# Patient Record
Sex: MALE | Race: WHITE | NOT HISPANIC OR LATINO
[De-identification: names, ages, dates, MRNs, and addresses within clinical notes are randomized per-mention and may not be internally consistent; named-entity substitution may affect disease eponyms.]

---

## 2022-05-18 ENCOUNTER — APPOINTMENT (OUTPATIENT)
Dept: FAMILY MEDICINE | Facility: CLINIC | Age: 22
End: 2022-05-18
Payer: COMMERCIAL

## 2022-05-18 ENCOUNTER — LABORATORY RESULT (OUTPATIENT)
Age: 22
End: 2022-05-18

## 2022-05-18 ENCOUNTER — NON-APPOINTMENT (OUTPATIENT)
Age: 22
End: 2022-05-18

## 2022-05-18 VITALS
RESPIRATION RATE: 16 BRPM | DIASTOLIC BLOOD PRESSURE: 74 MMHG | WEIGHT: 115 LBS | SYSTOLIC BLOOD PRESSURE: 124 MMHG | OXYGEN SATURATION: 99 % | HEIGHT: 67 IN | BODY MASS INDEX: 18.05 KG/M2 | HEART RATE: 101 BPM | TEMPERATURE: 97.9 F

## 2022-05-18 DIAGNOSIS — Z83.49 FAMILY HISTORY OF OTHER ENDOCRINE, NUTRITIONAL AND METABOLIC DISEASES: ICD-10-CM

## 2022-05-18 DIAGNOSIS — Z78.9 OTHER SPECIFIED HEALTH STATUS: ICD-10-CM

## 2022-05-18 DIAGNOSIS — Z86.39 PERSONAL HISTORY OF OTHER ENDOCRINE, NUTRITIONAL AND METABOLIC DISEASE: ICD-10-CM

## 2022-05-18 DIAGNOSIS — F17.200 NICOTINE DEPENDENCE, UNSPECIFIED, UNCOMPLICATED: ICD-10-CM

## 2022-05-18 DIAGNOSIS — Z00.00 ENCOUNTER FOR GENERAL ADULT MEDICAL EXAMINATION W/OUT ABNORMAL FINDINGS: ICD-10-CM

## 2022-05-18 DIAGNOSIS — Z84.1 FAMILY HISTORY OF DISORDERS OF KIDNEY AND URETER: ICD-10-CM

## 2022-05-18 PROCEDURE — G0442 ANNUAL ALCOHOL SCREEN 15 MIN: CPT

## 2022-05-18 PROCEDURE — 81003 URINALYSIS AUTO W/O SCOPE: CPT | Mod: QW

## 2022-05-18 PROCEDURE — 99395 PREV VISIT EST AGE 18-39: CPT | Mod: 25

## 2022-05-18 NOTE — HISTORY OF PRESENT ILLNESS
[FreeTextEntry1] : NPA CPE \par not fasting  [de-identified] : ADRIANA states he feels otherwise in good health.\par

## 2022-05-18 NOTE — HEALTH RISK ASSESSMENT
[Fair] :  ~his/her~ mood as fair [Current] : Current [0-4] : 0-4 [Yes] : Yes [Monthly or less (1 pt)] : Monthly or less (1 point) [1 or 2 (0 pts)] : 1 or 2 (0 points) [Never (0 pts)] : Never (0 points) [No] : In the past 12 months have you used drugs other than those required for medical reasons? No [No falls in past year] : Patient reported no falls in the past year [0] : 2) Feeling down, depressed, or hopeless: Not at all (0)

## 2022-06-10 LAB
ALBUMIN SERPL ELPH-MCNC: 5 G/DL
ALP BLD-CCNC: 130 U/L
ALT SERPL-CCNC: 21 U/L
ANION GAP SERPL CALC-SCNC: 13 MMOL/L
AST SERPL-CCNC: 16 U/L
BASOPHILS # BLD AUTO: 0.05 K/UL
BASOPHILS NFR BLD AUTO: 0.6 %
BILIRUB SERPL-MCNC: 1 MG/DL
BILIRUB UR QL STRIP: NORMAL
BUN SERPL-MCNC: 11 MG/DL
CALCIUM SERPL-MCNC: 10.3 MG/DL
CHLORIDE SERPL-SCNC: 102 MMOL/L
CHOLEST SERPL-MCNC: 147 MG/DL
CLARITY UR: CLEAR
CO2 SERPL-SCNC: 25 MMOL/L
COLLECTION METHOD: NORMAL
CREAT SERPL-MCNC: 0.75 MG/DL
EGFR: 131 ML/MIN/1.73M2
EOSINOPHIL # BLD AUTO: 0.15 K/UL
EOSINOPHIL NFR BLD AUTO: 1.9 %
ESTIMATED AVERAGE GLUCOSE: 100 MG/DL
GLUCOSE SERPL-MCNC: 99 MG/DL
GLUCOSE UR-MCNC: NORMAL
HBA1C MFR BLD HPLC: 5.1 %
HCG UR QL: 0.2 EU/DL
HCT VFR BLD CALC: 46.4 %
HDLC SERPL-MCNC: 30 MG/DL
HGB BLD-MCNC: 15.9 G/DL
HGB UR QL STRIP.AUTO: NORMAL
IMM GRANULOCYTES NFR BLD AUTO: 0.1 %
KETONES UR-MCNC: NORMAL
LDLC SERPL CALC-MCNC: 80 MG/DL
LEUKOCYTE ESTERASE UR QL STRIP: NORMAL
LYMPHOCYTES # BLD AUTO: 3.65 K/UL
LYMPHOCYTES NFR BLD AUTO: 46.4 %
MAN DIFF?: NORMAL
MCHC RBC-ENTMCNC: 32.4 PG
MCHC RBC-ENTMCNC: 34.3 GM/DL
MCV RBC AUTO: 94.7 FL
MONOCYTES # BLD AUTO: 0.65 K/UL
MONOCYTES NFR BLD AUTO: 8.3 %
NEUTROPHILS # BLD AUTO: 3.36 K/UL
NEUTROPHILS NFR BLD AUTO: 42.7 %
NITRITE UR QL STRIP: NORMAL
NONHDLC SERPL-MCNC: 118 MG/DL
PH UR STRIP: 5.5
PLATELET # BLD AUTO: 324 K/UL
POTASSIUM SERPL-SCNC: 4.4 MMOL/L
PROT SERPL-MCNC: 7.1 G/DL
PROT UR STRIP-MCNC: NORMAL
RBC # BLD: 4.9 M/UL
RBC # FLD: 11.6 %
SODIUM SERPL-SCNC: 140 MMOL/L
SP GR UR STRIP: 1.02
TRIGL SERPL-MCNC: 190 MG/DL
TSH SERPL-ACNC: <0.01 UIU/ML
WBC # FLD AUTO: 7.87 K/UL

## 2022-06-17 ENCOUNTER — NON-APPOINTMENT (OUTPATIENT)
Age: 22
End: 2022-06-17

## 2023-01-25 ENCOUNTER — NON-APPOINTMENT (OUTPATIENT)
Age: 23
End: 2023-01-25

## 2023-02-12 ENCOUNTER — APPOINTMENT (OUTPATIENT)
Dept: FAMILY MEDICINE | Facility: CLINIC | Age: 23
End: 2023-02-12
Payer: MEDICAID

## 2023-02-12 VITALS
DIASTOLIC BLOOD PRESSURE: 70 MMHG | HEART RATE: 94 BPM | BODY MASS INDEX: 17.27 KG/M2 | WEIGHT: 110 LBS | SYSTOLIC BLOOD PRESSURE: 120 MMHG | TEMPERATURE: 96.4 F | OXYGEN SATURATION: 97 % | HEIGHT: 67 IN

## 2023-02-12 PROCEDURE — G0444 DEPRESSION SCREEN ANNUAL: CPT | Mod: 59

## 2023-02-12 PROCEDURE — 99214 OFFICE O/P EST MOD 30 MIN: CPT | Mod: 25

## 2023-02-12 NOTE — PHYSICAL EXAM
[No Acute Distress] : no acute distress [Well Nourished] : well nourished [Well Developed] : well developed [Well-Appearing] : well-appearing [Normal Sclera/Conjunctiva] : normal sclera/conjunctiva [PERRL] : pupils equal round and reactive to light [EOMI] : extraocular movements intact [Normal Outer Ear/Nose] : the outer ears and nose were normal in appearance [No JVD] : no jugular venous distention [No Lymphadenopathy] : no lymphadenopathy [Supple] : supple [Thyroid Normal, No Nodules] : the thyroid was normal and there were no nodules present [No Respiratory Distress] : no respiratory distress  [No Accessory Muscle Use] : no accessory muscle use [Clear to Auscultation] : lungs were clear to auscultation bilaterally [Normal Rate] : normal rate  [Regular Rhythm] : with a regular rhythm [Normal S1, S2] : normal S1 and S2 [No Murmur] : no murmur heard [Pedal Pulses Present] : the pedal pulses are present [No Edema] : there was no peripheral edema [Soft] : abdomen soft [Non Tender] : non-tender [Non-distended] : non-distended [No CVA Tenderness] : no CVA  tenderness [No Spinal Tenderness] : no spinal tenderness [No Joint Swelling] : no joint swelling [Grossly Normal Strength/Tone] : grossly normal strength/tone [No Rash] : no rash [Coordination Grossly Intact] : coordination grossly intact [No Focal Deficits] : no focal deficits [Normal Gait] : normal gait [Deep Tendon Reflexes (DTR)] : deep tendon reflexes were 2+ and symmetric [Normal Affect] : the affect was normal [Alert and Oriented x3] : oriented to person, place, and time [Normal Mood] : the mood was normal [Normal Insight/Judgement] : insight and judgment were intact

## 2023-02-12 NOTE — HISTORY OF PRESENT ILLNESS
[FreeTextEntry1] : ADRIANA states he has some GI issues. Mucous BM. Would like to get a  antidepressant.  [de-identified] : Poor diet. Fast food and soda.

## 2023-02-12 NOTE — PLAN
[FreeTextEntry1] : Rx: Prozac 20 mg 1 po daily\par Encourage better diet and lifestyle choices. \par RTO in 1-3 months fasting blood work. \par GI cerone

## 2023-02-21 LAB
ALBUMIN SERPL ELPH-MCNC: 4.5 G/DL
ALP BLD-CCNC: 150 U/L
ALT SERPL-CCNC: 42 U/L
ANION GAP SERPL CALC-SCNC: 13 MMOL/L
AST SERPL-CCNC: 21 U/L
BASOPHILS # BLD AUTO: 0.03 K/UL
BASOPHILS NFR BLD AUTO: 0.4 %
BILIRUB SERPL-MCNC: 1.9 MG/DL
BUN SERPL-MCNC: 9 MG/DL
CALCIUM SERPL-MCNC: 9.8 MG/DL
CHLORIDE SERPL-SCNC: 106 MMOL/L
CHOLEST SERPL-MCNC: 111 MG/DL
CO2 SERPL-SCNC: 23 MMOL/L
CREAT SERPL-MCNC: 0.6 MG/DL
EGFR: 139 ML/MIN/1.73M2
EOSINOPHIL # BLD AUTO: 0.07 K/UL
EOSINOPHIL NFR BLD AUTO: 0.8 %
ESTIMATED AVERAGE GLUCOSE: 100 MG/DL
GLUCOSE SERPL-MCNC: 97 MG/DL
HBA1C MFR BLD HPLC: 5.1 %
HCT VFR BLD CALC: 42.6 %
HDLC SERPL-MCNC: 32 MG/DL
HGB BLD-MCNC: 14.3 G/DL
IMM GRANULOCYTES NFR BLD AUTO: 0.2 %
LDLC SERPL CALC-MCNC: 51 MG/DL
LYMPHOCYTES # BLD AUTO: 3.79 K/UL
LYMPHOCYTES NFR BLD AUTO: 44.4 %
MAN DIFF?: NORMAL
MCHC RBC-ENTMCNC: 30.1 PG
MCHC RBC-ENTMCNC: 33.6 GM/DL
MCV RBC AUTO: 89.7 FL
MONOCYTES # BLD AUTO: 0.7 K/UL
MONOCYTES NFR BLD AUTO: 8.2 %
NEUTROPHILS # BLD AUTO: 3.92 K/UL
NEUTROPHILS NFR BLD AUTO: 46 %
NONHDLC SERPL-MCNC: 79 MG/DL
PLATELET # BLD AUTO: 309 K/UL
POTASSIUM SERPL-SCNC: 4.4 MMOL/L
PROT SERPL-MCNC: 6.3 G/DL
RBC # BLD: 4.75 M/UL
RBC # FLD: 12.4 %
SODIUM SERPL-SCNC: 142 MMOL/L
T3 SERPL-MCNC: 408 NG/DL
T4 SERPL-MCNC: 14 UG/DL
TRIGL SERPL-MCNC: 137 MG/DL
TSH SERPL-ACNC: <0.01 UIU/ML
WBC # FLD AUTO: 8.53 K/UL

## 2023-03-23 ENCOUNTER — APPOINTMENT (OUTPATIENT)
Dept: ENDOCRINOLOGY | Facility: CLINIC | Age: 23
End: 2023-03-23
Payer: MEDICAID

## 2023-03-23 ENCOUNTER — NON-APPOINTMENT (OUTPATIENT)
Age: 23
End: 2023-03-23

## 2023-03-23 VITALS
WEIGHT: 107 LBS | HEIGHT: 66.5 IN | SYSTOLIC BLOOD PRESSURE: 134 MMHG | BODY MASS INDEX: 16.99 KG/M2 | OXYGEN SATURATION: 97 % | HEART RATE: 120 BPM | DIASTOLIC BLOOD PRESSURE: 70 MMHG

## 2023-03-23 DIAGNOSIS — Z56.0 UNEMPLOYMENT, UNSPECIFIED: ICD-10-CM

## 2023-03-23 PROCEDURE — 99204 OFFICE O/P NEW MOD 45 MIN: CPT

## 2023-03-23 SDOH — ECONOMIC STABILITY - INCOME SECURITY: UNEMPLOYMENT, UNSPECIFIED: Z56.0

## 2023-03-23 NOTE — ASSESSMENT
[FreeTextEntry1] : 23 year old male with PMH of anxiety and depression who presents with new onset moderate hyperthyroidism, which is likely due to Graves' Disease. \par \par Check thyroid US and TSI level now to confirm diagnosis.\par Start MMI 10 mg daily.\par Start Propranolol ER 80 mg daily to block adrenergic symptoms.  \par Discussed definitive therapy of Graves' Disease in detail and patient sounds open to I-131 therapy once hyperthyroidism is under better control, however need to work on smoking cessation first, as this may increase risk of orbitopathy.  \par \par Follow up in 2 months.

## 2023-03-23 NOTE — REASON FOR VISIT
[Initial Evaluation] : an initial evaluation [Hyperthyroidism] : hyperthyroidism [FreeTextEntry2] : Dr. Bolanos

## 2023-03-23 NOTE — PHYSICAL EXAM
[Healthy Appearance] : healthy appearance [No Acute Distress] : no acute distress [Normal Sclera/Conjunctiva] : normal sclera/conjunctiva [No Proptosis] : no proptosis [No Respiratory Distress] : no respiratory distress [Clear to Auscultation] : lungs were clear to auscultation bilaterally [Normal S1, S2] : normal S1 and S2 [No Murmurs] : no murmurs [Normal Rate] : heart rate was normal [Regular Rhythm] : with a regular rhythm [No Edema] : no peripheral edema [Normal Gait] : normal gait [No Clubbing, Cyanosis] : no clubbing  or cyanosis of the fingernails [Acanthosis Nigricans] : no acanthosis nigricans [Normal Affect] : the affect was normal [Normal Insight/Judgement] : insight and judgment were intact [Normal Mood] : the mood was normal [de-identified] : Thyroid is mildly enlarged and rubbery without palpable nodules, no bruit [de-identified] : Mild resting tremor

## 2023-03-23 NOTE — HISTORY OF PRESENT ILLNESS
[FreeTextEntry1] : Referred here by DR. Bolanos for recently diagnosed hyperthyroidism.\par Labs were done in May of 2022 showed suppressed TSH.\par He recently had repeat labs done showed continued suppression of TSH with moderately elevated levels of T4 and T3.\par Over the past year reports unintentional weight loss of over 20 pounds.  He also reports heat intolerance, anxiety and palpitations.\par Denies any associated neck pain.

## 2023-03-23 NOTE — REVIEW OF SYSTEMS
[Recent Weight Loss (___ Lbs)] : recent weight loss: [unfilled] lbs [Palpitations] : palpitations [Diarrhea] : diarrhea [Tremors] : tremors [Anxiety] : anxiety [Heat Intolerance] : heat intolerance [Blurred Vision] : no blurred vision [Neck Pain] : no neck pain [Shortness Of Breath] : no shortness of breath [Muscle Cramps] : no muscle cramps

## 2023-05-05 ENCOUNTER — APPOINTMENT (OUTPATIENT)
Dept: ULTRASOUND IMAGING | Facility: CLINIC | Age: 23
End: 2023-05-05
Payer: MEDICAID

## 2023-05-05 PROCEDURE — 76536 US EXAM OF HEAD AND NECK: CPT

## 2023-05-12 ENCOUNTER — APPOINTMENT (OUTPATIENT)
Dept: FAMILY MEDICINE | Facility: CLINIC | Age: 23
End: 2023-05-12
Payer: MEDICAID

## 2023-05-12 VITALS
WEIGHT: 114 LBS | DIASTOLIC BLOOD PRESSURE: 76 MMHG | BODY MASS INDEX: 18.32 KG/M2 | TEMPERATURE: 98 F | HEIGHT: 66 IN | HEART RATE: 93 BPM | OXYGEN SATURATION: 99 % | SYSTOLIC BLOOD PRESSURE: 102 MMHG | RESPIRATION RATE: 16 BRPM

## 2023-05-12 DIAGNOSIS — F41.9 ANXIETY DISORDER, UNSPECIFIED: ICD-10-CM

## 2023-05-12 DIAGNOSIS — F32.A ANXIETY DISORDER, UNSPECIFIED: ICD-10-CM

## 2023-05-12 PROCEDURE — 99214 OFFICE O/P EST MOD 30 MIN: CPT

## 2023-05-12 NOTE — HISTORY OF PRESENT ILLNESS
[FreeTextEntry1] : ADRIANA would like fluoxetine, methimazole and propranolol refilled \par no complaints feels in good health\par [place] [de-identified] : ADRIANA states he feels otherwise in good health.

## 2023-05-22 LAB — TSH SERPL-ACNC: <0.005

## 2023-05-26 ENCOUNTER — APPOINTMENT (OUTPATIENT)
Dept: ENDOCRINOLOGY | Facility: CLINIC | Age: 23
End: 2023-05-26
Payer: MEDICAID

## 2023-05-26 VITALS
WEIGHT: 117 LBS | DIASTOLIC BLOOD PRESSURE: 60 MMHG | SYSTOLIC BLOOD PRESSURE: 100 MMHG | BODY MASS INDEX: 18.36 KG/M2 | OXYGEN SATURATION: 98 % | HEIGHT: 67 IN | HEART RATE: 88 BPM

## 2023-05-26 DIAGNOSIS — F17.200 NICOTINE DEPENDENCE, UNSPECIFIED, UNCOMPLICATED: ICD-10-CM

## 2023-05-26 PROCEDURE — 99214 OFFICE O/P EST MOD 30 MIN: CPT

## 2023-05-26 NOTE — DATA REVIEWED
[FreeTextEntry1] : LABS:\par 5/12/2023:\par TSH < 0.005\par Free T4  1.98\par Free T3  7.3\par \par 3/27/2023:\par TSH < 0.005\par Free T4  4.25\par Free T3  12\par TSI  20\par \par Thyroid US 5/24/2023:\par Heterogeneous gland with increased vascularity, no nodules

## 2023-05-26 NOTE — PHYSICAL EXAM
[Healthy Appearance] : healthy appearance [No Acute Distress] : no acute distress [Normal Sclera/Conjunctiva] : normal sclera/conjunctiva [No Proptosis] : no proptosis [No Respiratory Distress] : no respiratory distress [Clear to Auscultation] : lungs were clear to auscultation bilaterally [Normal S1, S2] : normal S1 and S2 [No Murmurs] : no murmurs [Normal Rate] : heart rate was normal [Regular Rhythm] : with a regular rhythm [Normal Gait] : normal gait [No Clubbing, Cyanosis] : no clubbing  or cyanosis of the fingernails [No Tremors] : no tremors [Normal Insight/Judgement] : insight and judgment were intact [de-identified] : Thyroid is mildly enlarged and rubbery without palpable nodules

## 2023-05-26 NOTE — HISTORY OF PRESENT ILLNESS
[FreeTextEntry1] : Follow up hyperthyroidism.\par Routine labs done in May of 2022 showed suppressed TSH.\par At the time he had unintentional weight loss, heat intolerance, anxiety and palpitations.\par At time of referral here in March 2023, labs confirmed moderate hyperthyroidism due to Graves' Disease (+ TSI).\par He was started on MMI 10 mg daily and Propranolol ER 80 mg daily.\par \par \par Reports marked symptom improvement since starting Rx.  Has felt more calm with no palpitations, tremors or heat intolerance.

## 2023-05-26 NOTE — ASSESSMENT
[FreeTextEntry1] : 23 year old male with PMH of anxiety and depression who presents for follow up of hyperthyroidism due to Graves' Disease.  \par \par 1.  Hyperthyroidism: His hyperthyroidism is improving with medical therapy.  Continue current dose of methimazole and will continue propranolol for now until Free T4 and Free T3 are in normal range.  We discussed definitive therapy for Graves' Disease including I-131, but patient prefers to do a trial of medical therapy first.   Repeat below labs prior to next visit.   \par 2.  Tobacco dependence-  Advised smoking cessation to reduce the risk of Graves' Orbitopathy. \par \par Follow up in 3-4 months.

## 2023-09-08 ENCOUNTER — APPOINTMENT (OUTPATIENT)
Dept: ENDOCRINOLOGY | Facility: CLINIC | Age: 23
End: 2023-09-08
Payer: MEDICAID

## 2023-09-08 VITALS
DIASTOLIC BLOOD PRESSURE: 64 MMHG | OXYGEN SATURATION: 98 % | HEART RATE: 106 BPM | BODY MASS INDEX: 17.42 KG/M2 | SYSTOLIC BLOOD PRESSURE: 118 MMHG | WEIGHT: 111 LBS | HEIGHT: 67 IN

## 2023-09-08 PROCEDURE — 99214 OFFICE O/P EST MOD 30 MIN: CPT

## 2023-09-08 NOTE — ASSESSMENT
[FreeTextEntry1] : 23 year old male with PMH of anxiety and depression who presents for follow up of hyperthyroidism due to Graves' Disease.   He is responding well to therapy with thianomides.   Patient does not want to pursue I-131 therapy at this time (would need to avoid smoking if he changes his mind to reduce the risk of Graves' Orbitopathy). Continue current dose of MMI and Proproanolol. Check labs now.  Once Free Thyroid hormone levels are in normal range, can D/C propranolol.   Follow up in 3 months.

## 2023-09-08 NOTE — HISTORY OF PRESENT ILLNESS
[FreeTextEntry1] : Follow up hyperthyroidism. Routine labs done in May of 2022 showed suppressed TSH. At the time he had unintentional weight loss, heat intolerance, anxiety and palpitations. At time of referral here in March 2023, labs confirmed moderate hyperthyroidism due to Graves' Disease (+ TSI). He was started on MMI 10 mg daily and Propranolol ER 80 mg daily.  He currently denies any associated palpitations, tremors or fatigue.

## 2023-09-08 NOTE — PHYSICAL EXAM
[Healthy Appearance] : healthy appearance [No Acute Distress] : no acute distress [Normal Sclera/Conjunctiva] : normal sclera/conjunctiva [No Proptosis] : no proptosis [No Respiratory Distress] : no respiratory distress [Clear to Auscultation] : lungs were clear to auscultation bilaterally [Normal S1, S2] : normal S1 and S2 [No Murmurs] : no murmurs [Normal Rate] : heart rate was normal [Regular Rhythm] : with a regular rhythm [Normal Gait] : normal gait [No Tremors] : no tremors [Normal Insight/Judgement] : insight and judgment were intact [de-identified] : Thyroid is mildly enlarged and rubbery without palpable nodules

## 2023-09-08 NOTE — DATA REVIEWED
[FreeTextEntry1] : LABS: 5/12/2023: TSH < 0.005 Free T4  1.98 Free T3  7.3  3/27/2023: TSH < 0.005 Free T4  4.25 Free T3  12 TSI  20  Thyroid US 5/24/2023: Heterogeneous gland with increased vascularity, no nodules

## 2023-09-18 ENCOUNTER — NON-APPOINTMENT (OUTPATIENT)
Age: 23
End: 2023-09-18

## 2023-09-21 ENCOUNTER — NON-APPOINTMENT (OUTPATIENT)
Age: 23
End: 2023-09-21

## 2023-11-13 ENCOUNTER — RX RENEWAL (OUTPATIENT)
Age: 23
End: 2023-11-13

## 2024-01-11 ENCOUNTER — APPOINTMENT (OUTPATIENT)
Dept: ENDOCRINOLOGY | Facility: CLINIC | Age: 24
End: 2024-01-11
Payer: MEDICAID

## 2024-01-11 VITALS
SYSTOLIC BLOOD PRESSURE: 110 MMHG | OXYGEN SATURATION: 98 % | HEART RATE: 70 BPM | HEIGHT: 67 IN | TEMPERATURE: 98 F | RESPIRATION RATE: 14 BRPM | BODY MASS INDEX: 18.27 KG/M2 | WEIGHT: 116.38 LBS | DIASTOLIC BLOOD PRESSURE: 60 MMHG

## 2024-01-11 DIAGNOSIS — E05.90 THYROTOXICOSIS, UNSPECIFIED W/OUT THYROTOXIC CRISIS OR STORM: ICD-10-CM

## 2024-01-11 LAB
ALBUMIN SERPL ELPH-MCNC: 4.8 G/DL
ALP BLD-CCNC: 248 U/L
ALT SERPL-CCNC: 13 U/L
ANION GAP SERPL CALC-SCNC: 12 MMOL/L
AST SERPL-CCNC: 15 U/L
BILIRUB SERPL-MCNC: 1 MG/DL
BUN SERPL-MCNC: 10 MG/DL
CALCIUM SERPL-MCNC: 9.6 MG/DL
CHLORIDE SERPL-SCNC: 102 MMOL/L
CO2 SERPL-SCNC: 24 MMOL/L
CREAT SERPL-MCNC: 0.69 MG/DL
EGFR: 133 ML/MIN/1.73M2
GLUCOSE SERPL-MCNC: 95 MG/DL
POTASSIUM SERPL-SCNC: 4.4 MMOL/L
PROT SERPL-MCNC: 6.9 G/DL
SODIUM SERPL-SCNC: 138 MMOL/L
T3FREE SERPL-MCNC: 5.85 PG/ML
T4 FREE SERPL-MCNC: 1.9 NG/DL
TSH SERPL-ACNC: <0.01 UIU/ML

## 2024-01-11 PROCEDURE — 99214 OFFICE O/P EST MOD 30 MIN: CPT

## 2024-01-11 RX ORDER — PROPRANOLOL HYDROCHLORIDE 80 MG/1
80 CAPSULE, EXTENDED RELEASE ORAL
Qty: 90 | Refills: 1 | Status: DISCONTINUED | COMMUNITY
Start: 2023-11-13 | End: 2024-01-11

## 2024-01-11 RX ORDER — FLUOXETINE HYDROCHLORIDE 20 MG/1
20 CAPSULE ORAL
Qty: 90 | Refills: 1 | Status: DISCONTINUED | COMMUNITY
Start: 2023-02-12 | End: 2024-01-11

## 2024-01-11 NOTE — ASSESSMENT
[FreeTextEntry1] : 23 year old male with PMH of anxiety and depression who presents for follow up of severe hyperthyroidism due to Graves' Disease.   He remains hyperthyroid, but Free thyroid hormone levels seem to be improving with thionamide therapy.      Patient does not want to pursue I-131 therapy at this time (would need to avoid smoking if he changes his mind to reduce the risk of Graves' Orbitopathy). Continue current dose of MMI and Proproanolol. Repeat labs in 3 months.     Follow up in 3 months.

## 2024-01-11 NOTE — HISTORY OF PRESENT ILLNESS
[FreeTextEntry1] : Follow up hyperthyroidism. Routine labs done in May of 2022 showed suppressed TSH. At the time he had unintentional weight loss, heat intolerance, anxiety and palpitations. At time of referral here in March 2023, labs confirmed moderate hyperthyroidism due to Graves' Disease (+ TSI). He was started on MMI and Propranolol.  Current Regimen: MMI 20 mg daily (increased last visit)  Has some sweats at night, but denies tremors or palpitations.    Propranolol ER 80 mg daily.

## 2024-01-11 NOTE — PHYSICAL EXAM
[Healthy Appearance] : healthy appearance [No Acute Distress] : no acute distress [Normal Sclera/Conjunctiva] : normal sclera/conjunctiva [No Proptosis] : no proptosis [No Respiratory Distress] : no respiratory distress [Clear to Auscultation] : lungs were clear to auscultation bilaterally [Normal S1, S2] : normal S1 and S2 [No Murmurs] : no murmurs [Normal Rate] : heart rate was normal [Regular Rhythm] : with a regular rhythm [Normal Gait] : normal gait [No Tremors] : no tremors [Normal Insight/Judgement] : insight and judgment were intact [de-identified] : Thyroid is mildly enlarged and rubbery without palpable nodules

## 2024-01-11 NOTE — REVIEW OF SYSTEMS
[Fatigue] : no fatigue [Recent Weight Gain (___ Lbs)] : recent weight gain: [unfilled] lbs [Neck Pain] : no neck pain [Palpitations] : no palpitations [Diarrhea] : no diarrhea [Tremors] : no tremors [Insomnia] : no insomnia

## 2024-04-30 RX ORDER — METHIMAZOLE 10 MG/1
10 TABLET ORAL DAILY
Qty: 180 | Refills: 1 | Status: ACTIVE | COMMUNITY
Start: 2023-03-23 | End: 1900-01-01

## 2024-07-30 ENCOUNTER — APPOINTMENT (OUTPATIENT)
Dept: ENDOCRINOLOGY | Facility: CLINIC | Age: 24
End: 2024-07-30
Payer: MEDICAID

## 2024-07-30 VITALS
RESPIRATION RATE: 16 BRPM | SYSTOLIC BLOOD PRESSURE: 102 MMHG | DIASTOLIC BLOOD PRESSURE: 72 MMHG | WEIGHT: 118 LBS | HEART RATE: 67 BPM | BODY MASS INDEX: 18.52 KG/M2 | OXYGEN SATURATION: 99 % | HEIGHT: 67 IN

## 2024-07-30 DIAGNOSIS — E05.90 THYROTOXICOSIS, UNSPECIFIED W/OUT THYROTOXIC CRISIS OR STORM: ICD-10-CM

## 2024-07-30 PROCEDURE — 99214 OFFICE O/P EST MOD 30 MIN: CPT

## 2024-07-30 NOTE — DATA REVIEWED
[FreeTextEntry1] : LABS: 4/17/2024: TSH  < 0.01 Free T4  1.06 Free T3  3.63  5/12/2023: TSH < 0.005 Free T4  1.98 Free T3  7.3  3/27/2023: TSH < 0.005 Free T4  4.25 Free T3  12 TSI  20  Thyroid US 5/24/2023: Heterogeneous gland with increased vascularity, no nodules

## 2024-07-30 NOTE — PHYSICAL EXAM
[Healthy Appearance] : healthy appearance [No Acute Distress] : no acute distress [Normal Sclera/Conjunctiva] : normal sclera/conjunctiva [No Proptosis] : no proptosis [No LAD] : no lymphadenopathy [Supple] : the neck was supple [No Thyroid Nodules] : no palpable thyroid nodules [No Respiratory Distress] : no respiratory distress [Clear to Auscultation] : lungs were clear to auscultation bilaterally [Normal S1, S2] : normal S1 and S2 [No Murmurs] : no murmurs [Normal Rate] : heart rate was normal [Regular Rhythm] : with a regular rhythm [No Tremors] : no tremors [Normal Insight/Judgement] : insight and judgment were intact [de-identified] : Thyroid is mildly enlarged and rubbery without palpable nodules

## 2024-07-30 NOTE — ASSESSMENT
[FreeTextEntry1] : 24 year old male with PMH of anxiety and depression who presents for follow up hyperthyroidism due to Graves' Disease.        Continue current dose of MMI and Propranolol. Check TFTs now.    Encouraged patient to stop smoking. Recommended ophthalmology eval for c/o blurry vision, but no obvious signs of MICHAEL on exam today.

## 2024-07-30 NOTE — HISTORY OF PRESENT ILLNESS
[FreeTextEntry1] : Follow up hyperthyroidism. Routine labs done in May of 2022 showed suppressed TSH. At the time he had unintentional weight loss, heat intolerance, anxiety and palpitations. At time of referral here in March 2023, labs confirmed moderate hyperthyroidism due to Graves' Disease (+ TSI). He was started on MMI and Propranolol.  Current Regimen: MMI 20 mg daily   Propranolol ER 80 mg daily Not interested in I-131 therapy.    Has bene trying to cut back on smoking.   Complains of blurry vision in left eye.  has not seen ophthalmologist. No palpitations or tremors.

## 2024-07-30 NOTE — REVIEW OF SYSTEMS
[Fatigue] : fatigue [Recent Weight Gain (___ Lbs)] : recent weight gain: [unfilled] lbs [Blurred Vision] : blurred vision [Palpitations] : no palpitations

## 2024-10-08 ENCOUNTER — NON-APPOINTMENT (OUTPATIENT)
Age: 24
End: 2024-10-08

## 2024-11-08 ENCOUNTER — RX RENEWAL (OUTPATIENT)
Age: 24
End: 2024-11-08

## 2025-02-24 ENCOUNTER — RX RENEWAL (OUTPATIENT)
Age: 25
End: 2025-02-24

## 2025-03-11 LAB — TSH SERPL-ACNC: 14.7

## 2025-03-12 ENCOUNTER — APPOINTMENT (OUTPATIENT)
Dept: ENDOCRINOLOGY | Facility: CLINIC | Age: 25
End: 2025-03-12
Payer: MEDICAID

## 2025-03-12 VITALS
RESPIRATION RATE: 16 BRPM | WEIGHT: 125 LBS | HEART RATE: 80 BPM | SYSTOLIC BLOOD PRESSURE: 116 MMHG | HEIGHT: 67 IN | BODY MASS INDEX: 19.62 KG/M2 | OXYGEN SATURATION: 98 % | DIASTOLIC BLOOD PRESSURE: 72 MMHG

## 2025-03-12 DIAGNOSIS — E05.90 THYROTOXICOSIS, UNSPECIFIED W/OUT THYROTOXIC CRISIS OR STORM: ICD-10-CM

## 2025-03-12 PROCEDURE — 99214 OFFICE O/P EST MOD 30 MIN: CPT

## 2025-06-09 ENCOUNTER — RX RENEWAL (OUTPATIENT)
Age: 25
End: 2025-06-09

## 2025-06-13 ENCOUNTER — APPOINTMENT (OUTPATIENT)
Dept: ENDOCRINOLOGY | Facility: CLINIC | Age: 25
End: 2025-06-13
Payer: MEDICAID

## 2025-06-13 VITALS
BODY MASS INDEX: 19.62 KG/M2 | TEMPERATURE: 98 F | WEIGHT: 125 LBS | HEIGHT: 67 IN | DIASTOLIC BLOOD PRESSURE: 62 MMHG | OXYGEN SATURATION: 97 % | SYSTOLIC BLOOD PRESSURE: 110 MMHG | RESPIRATION RATE: 16 BRPM | HEART RATE: 85 BPM

## 2025-06-13 PROCEDURE — 99214 OFFICE O/P EST MOD 30 MIN: CPT

## 2025-06-13 PROCEDURE — G2211 COMPLEX E/M VISIT ADD ON: CPT | Mod: NC
